# Patient Record
(demographics unavailable — no encounter records)

---

## 2024-10-13 NOTE — REASON FOR VISIT
[Arrhythmia/ECG Abnorrmalities] : arrhythmia/ECG abnormalities [FreeTextEntry1] : 81-year-old male s/p CABG, s/p AVR who required permanent pacemaker for complete heart block returns for evaluation and post-op care. The patient has a history of AS, hypertension and hyperlipidemia. The patient has CKD. The patient denies syncope, near syncope or dizziness. He denies exertional chest pain or dyspnea. The patient currently has no signs or symptoms of of CHF. The patient presents for further evaluation and care. His wound has healed nicely with no signs of infection.  Normal functioning dual chamber permanent pacemaker - see scanned interrogation.

## 2024-10-13 NOTE — DISCUSSION/SUMMARY
[FreeTextEntry1] : Complete Heart Block following AVR AP 0.6 %  < 0.1 % Normal functioning pacemaker  F/U with Dr. Salcido in [place]  Established remote monitoring for Abdirahman.   I spent 45 minutes with the patient including: 10 minutes preparing for the visit.  25 minutes face to face. 10 minutes on documentation and coordination of care.

## 2024-10-24 NOTE — ASSESSMENT
[FreeTextEntry1] : I had the pleasure of seeing Mr. CHU in the office today to discuss results following thoracentesis.  Briefly, this is a 81 year old male status post AVR/CABG in September with postoperative pleural effusion status post left thoracentesis on 10/7/2024.   Repeat chest x-ray done today was independently reviewed and shows mild left basilar atelectasis without significant reaccumulation of fluid. There is no need for continued follow up in our office unless an issue arises.  I have fully reviewed and evaluated all available results. All questions were answered and concerns were addressed.   Plan: - Encouraged incentive spirometry - Follow up with cardiologist - Patient is in full understanding and agreement with the plan going forward.   I, Dr. Rc Petersen, personally performed the evaluation and management (E/M) services for this established patient who presents today with an existing condition.  That E/M includes conducting the examination, assessing all conditions, and establishing a new plan of care.  Today, Lyudmila Boyce PA-C, was here to observe my evaluation and management services for this/these condition(s) to be followed going forward.

## 2024-10-24 NOTE — PHYSICAL EXAM
[Sclera] : the sclera and conjunctiva were normal [Neck Appearance] : the appearance of the neck was normal [] : the neck was supple [Respiration, Rhythm And Depth] : normal respiratory rhythm and effort [Auscultation Breath Sounds / Voice Sounds] : lungs were clear to auscultation bilaterally [Heart Rate And Rhythm] : heart rate was normal and rhythm regular [Heart Sounds] : normal S1 and S2 [Examination Of The Chest] : the chest was normal in appearance [Bowel Sounds] : normal bowel sounds [Abnormal Walk] : normal gait [Skin Color & Pigmentation] : normal skin color and pigmentation [Skin Turgor] : normal skin turgor [No Focal Deficits] : no focal deficits [Oriented To Time, Place, And Person] : oriented to person, place, and time [Impaired Insight] : insight and judgment were intact [Affect] : the affect was normal [Mood] : the mood was normal [FreeTextEntry2] : +1 edema right lower extremity, trace left lower extremity

## 2024-10-24 NOTE — HISTORY OF PRESENT ILLNESS
[FreeTextEntry1] : Mr. ASTER CHU is a 81 year male who presents today for follow-up. Previously, he has been followed post cardiac surgery (AVR/CABG x 3 9/4/24) found to have a moderate left pleural effusion which was drained (600 cc serosanguinous fluid) on 10/7/2024. He is here today for post procedure follow up.   CXR: (no official read but independently reviewed)   Today, the patient reports feeling "very good" and denies shortness of breath. Patient denies chest pain, palpitations, shortness of breath, cough, fevers, chills, fatigue and unintentional weight loss or gain.    Referring: John